# Patient Record
Sex: MALE | Race: WHITE | NOT HISPANIC OR LATINO
[De-identification: names, ages, dates, MRNs, and addresses within clinical notes are randomized per-mention and may not be internally consistent; named-entity substitution may affect disease eponyms.]

---
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
## 2024-04-08 ENCOUNTER — NON-APPOINTMENT (OUTPATIENT)
Age: 19
End: 2024-04-08

## 2024-06-17 ENCOUNTER — NON-APPOINTMENT (OUTPATIENT)
Age: 19
End: 2024-06-17

## 2024-07-01 PROBLEM — Z00.00 ENCOUNTER FOR PREVENTIVE HEALTH EXAMINATION: Status: ACTIVE | Noted: 2024-07-01

## 2024-07-23 ENCOUNTER — NON-APPOINTMENT (OUTPATIENT)
Age: 19
End: 2024-07-23

## 2024-07-24 ENCOUNTER — NON-APPOINTMENT (OUTPATIENT)
Age: 19
End: 2024-07-24

## 2024-07-24 ENCOUNTER — APPOINTMENT (OUTPATIENT)
Dept: SURGERY | Facility: CLINIC | Age: 19
End: 2024-07-24
Payer: COMMERCIAL

## 2024-07-24 VITALS
BODY MASS INDEX: 28.75 KG/M2 | WEIGHT: 224.05 LBS | DIASTOLIC BLOOD PRESSURE: 70 MMHG | OXYGEN SATURATION: 97 % | TEMPERATURE: 98.2 F | HEIGHT: 74 IN | SYSTOLIC BLOOD PRESSURE: 120 MMHG | HEART RATE: 69 BPM

## 2024-07-24 DIAGNOSIS — Z87.891 PERSONAL HISTORY OF NICOTINE DEPENDENCE: ICD-10-CM

## 2024-07-24 DIAGNOSIS — Z77.22 CONTACT WITH AND (SUSPECTED) EXPOSURE TO ENVIRONMENTAL TOBACCO SMOKE (ACUTE) (CHRONIC): ICD-10-CM

## 2024-07-24 DIAGNOSIS — Z83.3 FAMILY HISTORY OF DIABETES MELLITUS: ICD-10-CM

## 2024-07-24 DIAGNOSIS — R19.8 OTHER SPECIFIED SYMPTOMS AND SIGNS INVOLVING THE DIGESTIVE SYSTEM AND ABDOMEN: ICD-10-CM

## 2024-07-24 PROCEDURE — 99203 OFFICE O/P NEW LOW 30 MIN: CPT

## 2024-07-24 NOTE — ASSESSMENT
[FreeTextEntry1] : My impression is this patient has a recurrent umbilical drainage problem.  Possible etiologies include patent urachal cyst that is intermittently decompressing through the inferior aspect of his umbilicus.  He can also have an unusual location for a pilonidal cyst in his umbilicus as he is quite hirsute.  Any other intestinal type fistulas would be highly unusual with no previous surgery  To better clarify the etiology he needs cross-sectional imaging and a CT scan of the abdomen and pelvis would be most demonstrative of etiologic causes and show the cross-sectional anatomy for any attempt at excision/repair  An ultrasound would be less useful however might demonstrate pathology however if there is pathology he would need to get a CT scan to clarify the pathology in anticipation for elective repair

## 2024-07-24 NOTE — PHYSICAL EXAM
[de-identified] : No lymphadenopathy [de-identified] : Looks well no acute distress [de-identified] : Umbilical and groin areas examined standing and supine no evidence of hernias  There is really no palpable sebaceous cyst nor any other masses etiology of leakage is not present during today's exam as the patient describes when it is in a quiescent phase he does not have any palpable areas

## 2024-07-24 NOTE — REASON FOR VISIT
[Initial Evaluation] : an initial evaluation [FreeTextEntry1] : Patient was referred by his pediatrician, Dr. Kessler, for the evaluation of recurrent umbilical infections and the question of a "fistula"

## 2024-07-24 NOTE — PLAN
[FreeTextEntry1] : I will order a CT scan of the abdomen and pelvis and see him afterwards to discuss and demonstrate findings  Spent 45 minutes reviewing this patient's history performing physical exam and coordinating care for diagnosis and eventual treatment

## 2024-07-24 NOTE — HISTORY OF PRESENT ILLNESS
[de-identified] : Patient is an otherwise healthy 19-year-old man who is active looks like he is in good physical condition has played sports and been evaluated previously for hernias in the past and has none  Complaining of redness and pain with drainage with pus and bleeding that happens intermittently in the inferior aspect of his bellybutton  He has no masses in that area no previous history of surgery and this has been going on and becoming more problematic for about a year most recently it was 30 days ago when it was inflamed he was prescribed antibiotics but did not take them and it resolved on its own  He is having no urinary symptoms no intestinal symptoms  Never has any gas or leakage from his umbilicus other times when he is not having the redness

## 2024-08-07 ENCOUNTER — NON-APPOINTMENT (OUTPATIENT)
Age: 19
End: 2024-08-07

## 2024-09-08 ENCOUNTER — RESULT REVIEW (OUTPATIENT)
Age: 19
End: 2024-09-08

## 2024-09-09 ENCOUNTER — APPOINTMENT (OUTPATIENT)
Dept: SURGERY | Facility: HOSPITAL | Age: 19
End: 2024-09-09

## 2024-09-09 ENCOUNTER — TRANSCRIPTION ENCOUNTER (OUTPATIENT)
Age: 19
End: 2024-09-09

## 2024-09-19 ENCOUNTER — APPOINTMENT (OUTPATIENT)
Dept: SURGERY | Facility: CLINIC | Age: 19
End: 2024-09-19

## 2024-09-19 VITALS
HEART RATE: 70 BPM | TEMPERATURE: 97.8 F | SYSTOLIC BLOOD PRESSURE: 121 MMHG | DIASTOLIC BLOOD PRESSURE: 58 MMHG | OXYGEN SATURATION: 98 %

## 2024-09-19 DIAGNOSIS — K42.9 UMBILICAL HERNIA W/OUT OBSTRUCTION OR GANGRENE: ICD-10-CM

## 2024-09-19 PROCEDURE — 99212 OFFICE O/P EST SF 10 MIN: CPT

## 2024-09-20 NOTE — ASSESSMENT
[FreeTextEntry1] : Doing well postop we will increase activity as tolerated and come back if he is having any other issues

## 2024-09-20 NOTE — REASON FOR VISIT
[Post Op: _________] : a [unfilled] post op visit [FreeTextEntry1] : Doing well postop from open umbilical hernia repair and excision of tissue in umbilicus

## 2024-09-20 NOTE — PHYSICAL EXAM
[de-identified] : Looks well no distress [de-identified] : Transverse incision healed well Steri-Strips removed no underlying seroma nor hernia

## 2024-09-20 NOTE — HISTORY OF PRESENT ILLNESS
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

## 2024-10-16 ENCOUNTER — APPOINTMENT (OUTPATIENT)
Dept: SURGERY | Facility: CLINIC | Age: 19
End: 2024-10-16
Payer: COMMERCIAL

## 2024-10-16 VITALS
DIASTOLIC BLOOD PRESSURE: 80 MMHG | OXYGEN SATURATION: 98 % | HEART RATE: 67 BPM | TEMPERATURE: 97.5 F | SYSTOLIC BLOOD PRESSURE: 117 MMHG

## 2024-10-16 DIAGNOSIS — K42.9 UMBILICAL HERNIA W/OUT OBSTRUCTION OR GANGRENE: ICD-10-CM

## 2024-10-16 PROCEDURE — 99212 OFFICE O/P EST SF 10 MIN: CPT

## 2025-04-16 ENCOUNTER — NON-APPOINTMENT (OUTPATIENT)
Age: 20
End: 2025-04-16

## 2025-05-18 ENCOUNTER — NON-APPOINTMENT (OUTPATIENT)
Age: 20
End: 2025-05-18

## 2025-05-20 ENCOUNTER — NON-APPOINTMENT (OUTPATIENT)
Age: 20
End: 2025-05-20

## 2025-06-26 ENCOUNTER — APPOINTMENT (OUTPATIENT)
Dept: SURGERY | Facility: CLINIC | Age: 20
End: 2025-06-26
Payer: COMMERCIAL

## 2025-06-26 VITALS
SYSTOLIC BLOOD PRESSURE: 133 MMHG | TEMPERATURE: 98.1 F | OXYGEN SATURATION: 98 % | DIASTOLIC BLOOD PRESSURE: 74 MMHG | HEART RATE: 78 BPM

## 2025-06-26 PROCEDURE — 99212 OFFICE O/P EST SF 10 MIN: CPT
